# Patient Record
Sex: MALE | Race: WHITE | URBAN - NONMETROPOLITAN AREA
[De-identification: names, ages, dates, MRNs, and addresses within clinical notes are randomized per-mention and may not be internally consistent; named-entity substitution may affect disease eponyms.]

---

## 2023-03-15 ENCOUNTER — OFFICE VISIT (OUTPATIENT)
Age: 23
End: 2023-03-15
Payer: COMMERCIAL

## 2023-03-15 VITALS
HEART RATE: 110 BPM | TEMPERATURE: 97.2 F | DIASTOLIC BLOOD PRESSURE: 76 MMHG | BODY MASS INDEX: 19.61 KG/M2 | RESPIRATION RATE: 20 BRPM | HEIGHT: 70 IN | WEIGHT: 137 LBS | OXYGEN SATURATION: 99 % | SYSTOLIC BLOOD PRESSURE: 142 MMHG

## 2023-03-15 DIAGNOSIS — J02.9 SORE THROAT: ICD-10-CM

## 2023-03-15 DIAGNOSIS — J01.90 ACUTE NON-RECURRENT SINUSITIS, UNSPECIFIED LOCATION: Primary | ICD-10-CM

## 2023-03-15 LAB — S PYO AG THROAT QL: NORMAL

## 2023-03-15 PROCEDURE — 99213 OFFICE O/P EST LOW 20 MIN: CPT | Performed by: NURSE PRACTITIONER

## 2023-03-15 PROCEDURE — 87880 STREP A ASSAY W/OPTIC: CPT | Performed by: NURSE PRACTITIONER

## 2023-03-15 RX ORDER — AMOXICILLIN 500 MG/1
500 CAPSULE ORAL 2 TIMES DAILY
Qty: 20 CAPSULE | Refills: 0 | Status: SHIPPED | OUTPATIENT
Start: 2023-03-15 | End: 2023-03-25

## 2023-03-15 ASSESSMENT — ENCOUNTER SYMPTOMS
GASTROINTESTINAL NEGATIVE: 1
RESPIRATORY NEGATIVE: 1
ALLERGIC/IMMUNOLOGIC NEGATIVE: 1
SINUS PAIN: 1
SINUS PRESSURE: 1
EYES NEGATIVE: 1
SORE THROAT: 1

## 2023-03-15 NOTE — PATIENT INSTRUCTIONS
Completely finish antibiotic. Start Claritin daily. Coolmist humidifier in bedroom. Warm salt water gargles if experiencing sore throat.

## 2023-03-15 NOTE — PROGRESS NOTES
Behzad Ortega (:  2000) is a 25 y.o. male,Established patient, here for evaluation of the following chief complaint(s):  Pharyngitis, Fever, and Nasal Congestion    Patient presents today complaining of sore throat, intermittent fever, nasal congestion, sinus pressure and pain, headache, green nasal drainage for the past 5-6 days. Denies GI symptoms, cough. Explained to patient that his rapid strep test today is negative. I am prescribing amoxicillin for sinus infection, and he is to start Claritin daily. Care instructions discussed. Patient verbalized understanding and agrees to plan of care. ASSESSMENT/PLAN:  1. Acute non-recurrent sinusitis, unspecified location  2. Sore throat  -     POCT rapid strep A     Orders Placed This Encounter   Medications    amoxicillin (AMOXIL) 500 MG capsule     Sig: Take 1 capsule by mouth 2 times daily for 10 days     Dispense:  20 capsule     Refill:  0        Return if symptoms worsen or fail to improve. Subjective   SUBJECTIVE/OBJECTIVE:  Pharyngitis  Associated symptoms include congestion (nasal), a fever, headaches and a sore throat. Fever   Associated symptoms include congestion (nasal), headaches and a sore throat. Pertinent negatives include no ear pain. Review of Systems   Constitutional:  Positive for fever. HENT:  Positive for congestion (nasal), sinus pressure, sinus pain and sore throat. Negative for ear pain. Eyes: Negative. Respiratory: Negative. Cardiovascular: Negative. Gastrointestinal: Negative. Endocrine: Negative. Genitourinary: Negative. Musculoskeletal: Negative. Skin: Negative. Allergic/Immunologic: Negative. Neurological:  Positive for headaches. Hematological: Negative. Psychiatric/Behavioral: Negative. Objective   Physical Exam  Vitals reviewed.    HENT:      Right Ear: Tympanic membrane, ear canal and external ear normal.      Left Ear: Tympanic membrane, ear canal and external ear normal.      Nose:      Right Sinus: Maxillary sinus tenderness and frontal sinus tenderness present. Left Sinus: Maxillary sinus tenderness and frontal sinus tenderness present. Mouth/Throat:      Lips: Pink. Mouth: Mucous membranes are moist.      Pharynx: Posterior oropharyngeal erythema (moderate erythema) present. No oropharyngeal exudate. Cardiovascular:      Rate and Rhythm: Normal rate and regular rhythm. Heart sounds: Normal heart sounds. Pulmonary:      Effort: Pulmonary effort is normal.      Breath sounds: Normal breath sounds. Lymphadenopathy:      Head:      Right side of head: No submandibular or tonsillar adenopathy. Left side of head: No submandibular or tonsillar adenopathy. Cervical:      Right cervical: No superficial cervical adenopathy. Left cervical: No superficial cervical adenopathy. Skin:     General: Skin is warm and dry. Neurological:      Mental Status: He is alert. Patient Instructions     Completely finish antibiotic. Start Claritin daily. Coolmist humidifier in bedroom. Warm salt water gargles if experiencing sore throat. Follow-up with your PCP if symptoms persist or worsen. An electronic signature was used to authenticate this note. --BASSEM Galvez - CNP     EM[facility] Dragon/translation disclaimer: Much of this encounter note is an electronic transcription/translation of spoken language to printed text. The electronic translation of spoken language may be erroneous, or at times, nonsensical words or phrases may be inadvertently transcribed.   Although I have reviewed the note for such errors, some may still exist.